# Patient Record
(demographics unavailable — no encounter records)

---

## 2024-10-14 NOTE — PHYSICAL EXAM
[TextEntry] : PHYSICAL EXAM  General: The patient was alert and oriented and in no distress. Voice was normal.   Face: The patient had no facial asymmetry or mass. The skin was unremarkable.  Ears: External ears were normal without deformity. Ear canals were normal. Tympanic membranes were intact and normal. No perforation or effusion  Nose:  The external nose had no significant deformity.  There was no facial tenderness.  On anterior rhinoscopy, the nasal mucosa was normal.  The anterior septum was normal. There were no visualized polyps purulence  or masses.  Oral cavity: The oral mucosa was normal. The oral and base of tongue were clear and without mass. The gingival and buccal mucosa were moist and without lesions. The palate moved well. There was no cleft palate. There appeared to be good salivary flow.   There was no pus, erythema or mass in the oral cavity/oropharynx.  Neck:  The neck was symmetrical. The parotid and submandibular glands were normal without masses. The trachea was midline and there was no unusual crepitus. Thyroid was smooth and nontender and no masses were palpated. Cervical adenopathy- none.  Procedure: Fiberoptic Nasolaryngoscopy Dx: Dysphagia, LPRD, Hoarseness Dr. Abel Crabtree Anesthetic: Lidocaine and oxymetazoline topical   Findings: The flexible scope was easily passed via the nose. The larynx was grossly normal. The epiglottis was normal. The hypopharynx and base of tongue were normal. The vallecula was normal. The vocal folds were grossly normal and moved normally. There are  mild changes of laryngopharyngeal reflux  (LPR)  manifest by mild ventricular swelling,  posterior commissure thickening. small right granuloma   No lesions were noted.

## 2024-10-14 NOTE — HISTORY OF PRESENT ILLNESS
[de-identified] : MARGOT MEDINA  is a 69 year man with a history of LPR and right vc granuloma. he is using famotidine 40mg hs and 20mg in am.

## 2024-10-14 NOTE — HISTORY OF PRESENT ILLNESS
[de-identified] : MARGOT MEDINA  is a 69 year man with a history of LPR and right vc granuloma. he is using famotidine 40mg hs and 20mg in am.

## 2024-12-19 NOTE — HISTORY OF PRESENT ILLNESS
[de-identified] : MARGOT MEDINA  is a 69 year man with a history of LPR and vc granuloma who is complaining of discomfort AS/ he feels a scratching discomfort AS. hearing not effected.

## 2024-12-19 NOTE — PHYSICAL EXAM
[TextEntry] : PHYSICAL EXAM  General: The patient was alert and oriented and in no distress. Voice was normal.  Face The patient had no facial asymmetry or mass. The skin was unremarkable.  Ears: External ears were normal without deformity. Ear canals were normal. Tympanic membranes were intact and normal. No perforation or effusion Left long hairs attached to EAC skin impinging on TM. I removed impacted cerumen from the left ear under the microscope  with curet, forceps    1 .5 cm superficial laceration of left Helix Nose:  The external nose had no significant deformity.  There was no facial tenderness.  On anterior rhinoscopy, the nasal mucosa was normal.  The anterior septum was normal. There were no visualized polyps purulence  or masses.  Oral cavity: The oral mucosa was normal. The oral and base of tongue were clear and without mass. The gingival and buccal mucosa were moist and without lesions. The palate moved well. There was no cleft palate. There appeared to be good salivary flow.   There was no pus, erythema or mass in the oral cavity/oropharynx.  Neck:  The neck was symmetrical. The parotid and submandibular glands were normal without masses. The trachea was midline and there was no unusual crepitus. Thyroid was smooth and nontender and no masses were palpated. Cervical adenopathy- none.

## 2024-12-19 NOTE — ASSESSMENT
[FreeTextEntry1] : MARGOT MEDINA felt better after cleaning his ear. He has superficial laceration left helix. He will apply bacitracin bid for several days.

## 2025-02-19 NOTE — PHYSICAL EXAM
[TextEntry] : PHYSICAL EXAM  General: The patient was alert and oriented and in no distress. Voice was normal.    Face: The patient had no facial asymmetry or mass. The skin was unremarkable.  Ears: External ears were normal without deformity. Ear canals were normal. Tympanic membranes were intact and normal. No perforation or effusion  Nose:  The external nose had no significant deformity.  There was no facial tenderness.  On anterior rhinoscopy, the nasal mucosa was normal.  The anterior septum was normal. There were no visualized polyps purulence  or masses.  Oral cavity: The oral mucosa was normal. The oral and base of tongue were clear and without mass. The gingival and buccal mucosa were moist and without lesions. The palate moved well. There was no cleft palate. There appeared to be good salivary flow.   There was no pus, erythema or mass in the oral cavity/oropharynx.  Neck:  The neck was symmetrical. The parotid and submandibular glands were normal without masses. The trachea was midline and there was no unusual crepitus. Thyroid was smooth and nontender and no masses were palpated. Cervical adenopathy- none.  Procedure: Fiberoptic Nasolaryngoscopy Dx: Dysphagia, LPRD, Hoarseness Dr. Abel Crabtree Anesthetic: Lidocaine and oxymetazoline topical   Findings: The flexible scope was easily passed via the nose. The larynx was grossly normal. The epiglottis was normal. The hypopharynx and base of tongue were normal. The vallecula was normal. The vocal folds were grossly normal and moved normally. There are  mild changes of laryngopharyngeal reflux  (LPR)  manifest by mild ventricular swelling,  posterior commissure thickening.   No lesions were noted.   General: The patient was alert and oriented and in no distress. Voice was normal.  Neurologic: Cranial Nerves II-XII intact PERRLA There was no significant nystagmus or disconjugate gaze noted.  EOM's: Normal  Face: The patient had no facial asymmetry or mass. The skin was unremarkable.  Ears: External ears were normal without deformity. Ear canals were normal. Tympanic membranes were intact and normal. No perforation or effusion  Nose:  The external nose had no significant deformity.  There was no facial tenderness.  On anterior rhinoscopy, the nasal mucosa was normal.  The anterior septum was normal. There were no visualized polyps purulence  or masses.  Oral cavity: The oral mucosa was normal. The oral and base of tongue were clear and without mass. The gingival and buccal mucosa were moist and without lesions. The palate moved well. There was no cleft palate. There appeared to be good salivary flow.   There was no pus, erythema or mass in the oral cavity/oropharynx.  Neck:  The neck was symmetrical. The parotid and submandibular glands were normal without masses. The trachea was midline and there was no unusual crepitus. Thyroid was smooth and nontender and no masses were palpated. Cervical adenopathy- none.  Procedure: Fiberoptic Nasolaryngoscopy Dx: Dysphagia, LPRD, Hoarseness Dr. Abel Crabtree Anesthetic: Lidocaine and oxymetazoline topical   Findings: The flexible scope was easily passed via the nose. The larynx was grossly normal. The epiglottis was normal. The hypopharynx and base of tongue were normal. The vallecula was normal. The vocal folds were grossly normal and moved normally. There are  mild changes of laryngopharyngeal reflux  (LPR)  manifest by mild ventricular swelling,  posterior commissure thickening.  small right vc granuloma No lesions were noted.

## 2025-02-19 NOTE — HISTORY OF PRESENT ILLNESS
[de-identified] : MARGOT MEDINA  is a 69 year man with a history of vc granuloma. He had recent endoscopy with Dr. Heredia.

## 2025-02-19 NOTE — REVIEW OF SYSTEMS
[Negative] : Heme/Lymph [As Noted in HPI] : as noted in HPI [Patient Intake Form Reviewed] : Patient intake form was reviewed

## 2025-05-21 NOTE — HISTORY OF PRESENT ILLNESS
[de-identified] : MARGOT MEDINA  is a 70 year man with a history of LPR, small right vc granuloma. He feels well.  His right ear is ictchy.

## 2025-05-21 NOTE — ASSESSMENT
[FreeTextEntry1] : MARGOT MEDINA is doing well. I asked him to see Dr. Purcell to evaluate his larynx.

## 2025-05-21 NOTE — PHYSICAL EXAM
[TextEntry] : PHYSICAL EXAM  General: The patient was alert and oriented and in no distress. Voice was normal.    Face: The patient had no facial asymmetry or mass. The skin was unremarkable.  Ears: External ears were normal without deformity. Ear canals were normal. Tympanic membranes were intact and normal. No perforation or effusion I removed impacted cerumen from both ears under the microscope with curet, forceps and suction (and hair fromTM AS)  Nose:  The external nose had no significant deformity.  There was no facial tenderness.  On anterior rhinoscopy, the nasal mucosa was normal.  The anterior septum was normal. There were no visualized polyps purulence  or masses.  Oral cavity: The oral mucosa was normal. The oral and base of tongue were clear and without mass. The gingival and buccal mucosa were moist and without lesions. The palate moved well. There was no cleft palate. There appeared to be good salivary flow.   There was no pus, erythema or mass in the oral cavity/oropharynx.  Neck:  The neck was symmetrical. The parotid and submandibular glands were normal without masses. The trachea was midline and there was no unusual crepitus. Thyroid was smooth and nontender and no masses were palpated. Cervical adenopathy- none.  Procedure: Fiberoptic Nasolaryngoscopy Dx: Dysphagia, LPRD, Hoarseness Dr. Abel Crabtree Anesthetic: Lidocaine and oxymetazoline topical   Findings: The flexible scope was easily passed via the nose. The larynx was grossly normal. The epiglottis was normal. The hypopharynx and base of tongue were normal. The vallecula was normal. The vocal folds were grossly normal and moved normally. small right vc granuloma. There are  mild changes of laryngopharyngeal reflux  (LPR)  manifest by mild ventricular swelling,  posterior commissure thickening.   No lesions were noted.

## 2025-06-04 NOTE — HISTORY OF PRESENT ILLNESS
[de-identified] : MARGOT MEDINA  is a 70 year man with a history of LPR is having left ear discomfort for serval days.

## 2025-06-04 NOTE — PHYSICAL EXAM
[TextEntry] : PHYSICAL EXAM  General: The patient was alert and oriented and in no distress. Voice was normal.    Face: The patient had no facial asymmetry or mass. The skin was unremarkable.  Ears: External ears were normal without deformity. Ear canals were normal. Tympanic membranes were intact and normal. No perforation or effusion cerumen and long hair impinging TM removed under microscope. Nose:  The external nose had no significant deformity.  There was no facial tenderness.  On anterior rhinoscopy, the nasal mucosa was normal.  The anterior septum was normal. There were no visualized polyps purulence  or masses.  Oral cavity: The oral mucosa was normal. The oral and base of tongue were clear and without mass. The gingival and buccal mucosa were moist and without lesions. The palate moved well. There was no cleft palate. There appeared to be good salivary flow.   There was no pus, erythema or mass in the oral cavity/oropharynx.  Neck:  The neck was symmetrical. The parotid and submandibular glands were normal without masses. The trachea was midline and there was no unusual crepitus. Thyroid was smooth and nontender and no masses were palpated. Cervical adenopathy- none.

## 2025-06-12 NOTE — HISTORY OF PRESENT ILLNESS
[de-identified] : - 6/11/25 69 y/o M referred by Dr. 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 Endorses increased phlegm in the morning. No voice changes or throat pain. No issues eating, chewing, or swallowing. No food regurgitation. No breathing issues. History of LPR, well-known to Dr. Crabtree taking pantoprazole daily and famotidine as needed. Xlear nasal spray for postnasal drip per Dr. Crabtree. Previously managed by GI Dr. Gifford with lansoprazole. More recently, positive SIBO breath test 3/2025 underwent treatment with Xifaxin without benefit plan for repeat testing this month. Endoscopy 1/2025 with Dr. Hay found to have moderate hiatal hernia - no pathology reports available. No smoking history.   Diet: +coffee (2-3/day), garlic (little), onion, tomato, citrus, blueberry -tea, soda, carbonated beverages, spicy food, chocolate, mint water intake:  32 oz per day late night eating: no -

## 2025-06-12 NOTE — HISTORY OF PRESENT ILLNESS
[de-identified] : - 6/11/25 69 y/o M referred by Dr. 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 Endorses increased phlegm in the morning. No voice changes or throat pain. No issues eating, chewing, or swallowing. No food regurgitation. No breathing issues. History of LPR, well-known to Dr. Crabtree taking pantoprazole daily and famotidine as needed. Xlear nasal spray for postnasal drip per Dr. Crabtree. Previously managed by GI Dr. Gifford with lansoprazole. More recently, positive SIBO breath test 3/2025 underwent treatment with Xifaxin without benefit plan for repeat testing this month. Endoscopy 1/2025 with Dr. Hay found to have moderate hiatal hernia - no pathology reports available. No smoking history.   Diet: +coffee (2-3/day), garlic (little), onion, tomato, citrus, blueberry -tea, soda, carbonated beverages, spicy food, chocolate, mint water intake:  32 oz per day late night eating: no -

## 2025-06-12 NOTE — PROCEDURE
[de-identified] : - Procedure: Flexible Laryngoscopy with Stroboscopy - [facility] [address]   Pre-operative Diagnosis: Phlegm in throat Post-operative Diagnosis: LPR, pseudo-sulcus Anesthesia: Topical - 1 % Lidocaine/Phenylephrine   Procedure Details: The patient was placed in the sitting position. After decongestant and anesthesia were applied the laryngoscope was passed. The nasal cavities, nasopharynx, oropharynx, hypopharynx, and larynx were all examined. Vocal folds were examined during respiration and phonation. The following findings were noted:   Findings: Nose: Septum is midline, turbinates are normal, nasal airways patent, mucosa normal Nasopharynx: Adenoids normal, no masses, eustachian tube normal Oropharynx: Pharyngeal walls symmetric and without lesion. Tonsils/fossae symmetric Hypopharynx: Hypopharynx and pyriform sinuses without lesion. No masses or asymmetry. No pooling of secretions Larynx: Epiglottis and aryepiglottic folds sharp and crisp bilaterally. False vocal folds normal bilaterally. Airway was widely patent. +post cricoid edema   Strobe Exam Ratings: TVF Appearance: normal, pseudosulcus TVF Mobility: normal Edema/hypertrophy: normal, postcricoid edema Mucus on TVF: normal Glottic Closure: normal/adequate Mucosal Wave: normal Amplitude of Vibration: normal Phase: symmetric Supraglottic Hyperfunction: none Other Findings: none   Condition: Stable. Patient tolerated procedure well.   --------------------------------------------------------------------------------------------------   Procedure: Pharyngeal and speech evaluation, by cine or video - CPT 15802 Voice assessment was recorded via video recording on this date.   Clarity: Normal Range: Normal Pitch Control: Normal Projection: Normal Tremor: None Cough: None   Condition: Stable. Patient tolerated procedure well. Complications: None.

## 2025-06-12 NOTE — REASON FOR VISIT
[Initial Consultation] : an initial consultation for [FreeTextEntry2] : concern for vocal process granuloma

## 2025-06-12 NOTE — ASSESSMENT
[FreeTextEntry1] :  - 6/11/25 69 y/o M well known to Dr. 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 On physical exam/stroboscopy there was evidence of postcricoid edema and pseudosulcus, no evidence of vocal process granuloma. Based on history and exam, I believe there is a component of laryngopharyngeal reflux. At this time I am recommending he continue reflux dietary/behavioral changes and medications as well as that he continue to follow up with Dr. Crabtree and GI. Follow up as needed.    Plan: - Continue dietary and behavioral modification to reduce acid reflux - Continue pantoprazole and famotidine per Dr. Crabtree - Voice hygiene, increase hydration, sips of water throughout the day, avoid throat clearing - Follow up as needed; continue to follow with Dr. Crabtree and GI

## 2025-06-12 NOTE — PROCEDURE
[de-identified] : - Procedure: Flexible Laryngoscopy with Stroboscopy - [facility] [id number]   Pre-operative Diagnosis: Phlegm in throat Post-operative Diagnosis: LPR, pseudo-sulcus Anesthesia: Topical - 1 % Lidocaine/Phenylephrine   Procedure Details: The patient was placed in the sitting position. After decongestant and anesthesia were applied the laryngoscope was passed. The nasal cavities, nasopharynx, oropharynx, hypopharynx, and larynx were all examined. Vocal folds were examined during respiration and phonation. The following findings were noted:   Findings: Nose: Septum is midline, turbinates are normal, nasal airways patent, mucosa normal Nasopharynx: Adenoids normal, no masses, eustachian tube normal Oropharynx: Pharyngeal walls symmetric and without lesion. Tonsils/fossae symmetric Hypopharynx: Hypopharynx and pyriform sinuses without lesion. No masses or asymmetry. No pooling of secretions Larynx: Epiglottis and aryepiglottic folds sharp and crisp bilaterally. False vocal folds normal bilaterally. Airway was widely patent. +post cricoid edema   Strobe Exam Ratings: TVF Appearance: normal, pseudosulcus TVF Mobility: normal Edema/hypertrophy: normal, postcricoid edema Mucus on TVF: normal Glottic Closure: normal/adequate Mucosal Wave: normal Amplitude of Vibration: normal Phase: symmetric Supraglottic Hyperfunction: none Other Findings: none   Condition: Stable. Patient tolerated procedure well.   --------------------------------------------------------------------------------------------------   Procedure: Pharyngeal and speech evaluation, by cine or video - CPT 28572 Voice assessment was recorded via video recording on this date.   Clarity: Normal Range: Normal Pitch Control: Normal Projection: Normal Tremor: None Cough: None   Condition: Stable. Patient tolerated procedure well. Complications: None.